# Patient Record
Sex: MALE | Race: WHITE | NOT HISPANIC OR LATINO | Employment: UNEMPLOYED | URBAN - METROPOLITAN AREA
[De-identification: names, ages, dates, MRNs, and addresses within clinical notes are randomized per-mention and may not be internally consistent; named-entity substitution may affect disease eponyms.]

---

## 2025-06-30 NOTE — PROGRESS NOTES
Percocet 10 mg X 4 and Lyrica 75 mg at bedtime from Steve Krishnamurthy MD     History of Present Complaint:  The patient was referred to us by Referring Provider: ***. this is 40 y.o.  male {Accompanied by:[id number]}with a past history of {kt past medical history:23446} presenting with {kt mrdica symptoms:89143}    Pain started {pain onset:96797}  Pain is {Better or worse:60993}   Patient history significant for the following red flags: {Red flags:36026}  The pain is described as {Pain description:58262} and is relieved by {pain relief:06552}      Prior Pain Therapies: {Prior pain therapy:72251}    Past surgical history:  {Past surgical history:72256}           Procedures:   *** the patient has had a ***% improvement in pain.    Portions of record reviewed for pertinent issues: active problem list, medication list, allergies, family history, social history, notes from last encounter, encounters, lab results, imaging and other available records.    I have personally reviewed the OARRS report for this patient. This report is scanned into the electronic medical record. I have considered the risks of abuse, dependence, addiction and diversion. It showed: Percocet 10 mg x 4 plus Lyrica 75 mg at bedtime from Steve Krishnamurthy MD [facility]  OPIOID RISK ASSESSMENT SCORE ***/26  Controlled substance agreement: ***  Activities of daily living: ***  Adverse effects: ***  Analgesia: W/O ***/10, W ***/10  {***}  Toxicology screen: ***  Aberrant behavior: ***  My patient has no underlying substance abuse or alcohol abuse and there's no mental health conditions contributing to the patient's pain.        Diagnostic studies:  10/4/2024 lumbar x-ray showed slight degenerative compression at L2 but no compression fractures    Employment/disability/litigation: {ktlitigation:85309}    Social History:  {KT social history:23752}       Review of Systems       Physical Exam       Assessment  ***           Plan  At least 50% of the visit was  involved in the discussion of the options for treatment. We discussed exercises, medication, interventional therapies and surgery. Healthy life style is essential with patient hard work to achieve the wellness. In addition; discussion with the patient and/or family about any of the diagnostic results, impressions and/or recommended diagnostic studies, prognosis, risks and benefits of treatment options, instructions for treatment and/or follow-up, importance of compliance with chosen treatment options, risk-factor reduction, and patient/family education.           Recommend self-directed physical therapy with at least daily exercises for minimum of 20-minute  *** Smoking cessation  Healthy lifestyle and anti-inflammatory diet in addition to weight control discussed with the patient  Alternative chronic pain therapies was discussed, encouraged and information was handed  Return to Clinic 3 months       *Please note this report has been produced using speech recognition software and may contain errors related to that system including grammar, punctuation and spelling as well as words and phrases that may be inappropriate. If there are questions or concerns, please feel free to contact me to clarify.    Paramjit Telles MD

## 2025-07-01 NOTE — PROGRESS NOTES
No chief complaint on file.    History of Present Complaint:  The patient was referred to us by Referring Provider: ***. this is 40 y.o.  male  with a past history of Chronic Pain: pt is on chronic opioid therapy. Pt states he has his refills and is doing well on the medication. Condition is stable.  Restless Leg Symptoms, Sleep Difficulty:  presenting with {kt mrdica symptoms:00008}    Pain started *** due to {pain onset:31321}  Pain is {Better or worse:48938} {DESC; BETTER/WORSE:91801}   The pain is described as {Pain description:37466} and is relieved by {pain relief:88991}      Prior Pain Therapies: {Prior pain therapy:99329}  Past surgical history:  {Past surgical history:31935}      Employment/disability/litigation: {ktlitigation:11944}  Social history: {KT social history:51736}    Diagnostic studies: External images        Yang Each Box that Applies Female Male   FAMILY HISTORY OF SUBSTANCE ABUSE  Yang the boxes that applies   Alcohol ?  1    ? 3   Illegal drugs ?  2 ? 3   Rx drugs ?  4 ? 4   PERSONAL HISTORY OF SUBSTNACE ABUSE   Alcohol ?  3 ?  3   Illegal drugs ?  4 ?  4    Rx drugs ?  5 ?  5   Age Between 16-45 years ?  1 ?  1   History of Preadolescent Sexual Abuse ?  3 ?  0   PSYCHOLOGIC DISEASE   ADD, OCD, bipolar, schizophrenia   ?  2 ?  2   Depression ?  1 ?  1   Scoring Totals       Scoring (Risk)  0-3 - Low  4-7 - Moderate  8 - High  Opioid Risk Assessment Score ***/26

## 2025-07-02 ENCOUNTER — APPOINTMENT (OUTPATIENT)
Dept: PAIN MEDICINE | Facility: CLINIC | Age: 40
End: 2025-07-02
Payer: COMMERCIAL

## 2025-07-24 NOTE — PROGRESS NOTES
History of Present Complaint:  The patient was referred to us by Referring Provider: Self-referral. this is 40 y.o.  male accompanied by his wife Estela with a past history of anxiety/depression, PTSD, TBI, migraine headache, restless leg syndrome, chronic lower back pain managed with Percocet 10 mg x 4 daily and pregabalin 75 mg twice daily from PCP for many years presenting with changing insurance and his primary care now wants a recommendation from pain management prior to continuation of his pain medication.  The patient stated that he was treated at the [facility] and he had his MRI goes back to 2018 which showed central disc herniation with some degenerative changes and minor foraminal stenosis.  The patient has tried multiple injection and a lot of physical therapy without any benefit.  The only thing works for him is Percocet 10 mg x 4 daily in addition to his pregabalin 75 mg x 2 daily.  The patient is able to do a full-time job as a   and his wife with him stating that he is able to do his home duties to her and his kids without any issues.  The patient denies any saddle paresthesia paralysis or incontinence  The patient denies any red flags        Procedures:   Multiple injections between 2017 and 2021 at [facility] without benefit    Portions of record reviewed for pertinent issues: active problem list, medication list, allergies, family history, social history, notes from last encounter, encounters, lab results, imaging and other available records.    I have personally reviewed the OARRS report for this patient. This report is scanned into the electronic medical record. I have considered the risks of abuse, dependence, addiction and diversion. It showed: Percocet 10 mg x 4 daily and pregabalin 75 mg x 2 daily from [facility] PCP  OPIOID RISK ASSESSMENT SCORE 3/26  Aberrant behavior: None  My patient has no underlying substance abuse or alcohol abuse and there's no mental health  conditions contributing to the patient's pain.    Patient is being treated with opioid therapy for pain from PCP and is responding appropriately.  There are NO signs of opioid intoxication, abuse, addiction or withdrawal.  Pupils are equal, reactive to light bilateral, appropriate speech and cognition. Patient denies any opioid induced constipation. The OARRS registry should followed by PCP periodically, urine toxicology.    Patient is advised and warned  in specific detail about potential benefits of opioids along with risks and side effects including, but not limited to, dependency, addiction, tolerance, hyperalgesia, anxiety, depression, insomnia, endocrine changes, immunologic disturbances, respiratory depression and death.     Caution advised regarding the use of medications prescribed at this office and specific mention made regarding not to drive or operate heavy machinery if feeling side effects from this the medications. Patient  expressed an understanding in regards to these particular concerns.     Discussed non-opioid options including (But no limited), physical wellness, antiinflammatory diet, icing and heating, meditation, relaxation, massage and acupuncture.    PCP will continue to monitor and adjust medications as needed.                Diagnostic studies:  12/4/2024 lumbar x-ray report from [facility] showed minimal degenerative changes possible L2 superior endplate mild fracture.  Thoracic spine x-ray did not show any abnormality      Employment/disability/litigation: Works as a   full-time    Social History:  with 3 children      Review of Systems   Constitutional:  Negative for chills, fever and unexpected weight change.   HENT:  Negative for congestion and trouble swallowing.    Respiratory:  Negative for cough, shortness of breath and wheezing.    Cardiovascular:  Negative for chest pain.   Gastrointestinal:  Negative for abdominal distention, abdominal pain, blood in  stool, constipation, diarrhea, nausea and vomiting.   Genitourinary:  Negative for difficulty urinating.   Musculoskeletal:  Positive for back pain and myalgias. Negative for arthralgias and joint swelling.   Skin:  Negative for color change.   Neurological:  Negative for dizziness, speech difficulty, light-headedness and headaches.   Psychiatric/Behavioral:  Negative for confusion and sleep disturbance.           Physical Exam  Constitutional:       General: He is awake.      Appearance: Normal appearance.     HENT:      Head: Normocephalic and atraumatic.      Nose: Nose normal.      Mouth/Throat:      Mouth: Mucous membranes are moist.     Eyes:      Pupils: Pupils are equal, round, and reactive to light.     Neck:      Thyroid: No thyroid mass.      Trachea: Phonation normal.     Cardiovascular:      Rate and Rhythm: Normal rate and regular rhythm.   Pulmonary:      Effort: Pulmonary effort is normal. No respiratory distress.      Breath sounds: Normal air entry. No decreased breath sounds, wheezing, rhonchi or rales.   Abdominal:      General: Bowel sounds are normal. There is no distension.      Palpations: Abdomen is soft.      Tenderness: There is no abdominal tenderness.     Musculoskeletal:         General: Tenderness present.      Cervical back: Neck supple.      Right lower leg: No edema.      Left lower leg: No edema.     Skin:     General: Skin is warm.      Capillary Refill: Capillary refill takes less than 2 seconds.     Neurological:      General: No focal deficit present.      Mental Status: He is alert and oriented to person, place, and time. Mental status is at baseline.      Cranial Nerves: Cranial nerves 2-12 are intact.      Motor: Motor function is intact.     Psychiatric:         Attention and Perception: Attention and perception normal.         Mood and Affect: Mood normal.         Speech: Speech normal.         Behavior: Behavior normal.            Assessment  This is a pleasant 40 years  old gentleman who is here accompanied with his wife who works  at [employer] complaining of chronic lower back pain who had physical therapy a year and a half ago and he had injections the last one 2 years ago for many years without much benefit he is able to function with Percocet 10 mg x 4 daily in addition to pregabalin 75 mg x 2 daily from his PCP.  I explained the patient that I do not prescribe chronic opioid therapy and our program is to get the patient off opioid therapy.  But I feel like he is stable on this medication and he does not want to change anything at this time.  I told him our program has multiple technologies and we have really a lot of improved back therapies and if he is interested to switch and get off his medication I will be happy to order new MRI on him and discuss any other modalities in the meantime he is very open to get enrolled in [facility] program which we did today.    Plan  At least 50% of the visit was involved in the discussion of the options for treatment. We discussed exercises, medication, interventional therapies and surgery. Healthy life style is essential with patient hard work to achieve the wellness. In addition; discussion with the patient and/or family about any of the diagnostic results, impressions and/or recommended diagnostic studies, prognosis, risks and benefits of treatment options, instructions for treatment and/or follow-up, importance of compliance with chosen treatment options, risk-factor reduction, and patient/family education.           Recommend self-directed physical therapy with at least daily exercises for minimum of 20-minute  Referral to [facility]  PCP and continue his opioid therapy  Healthy lifestyle and anti-inflammatory diet in addition to weight control discussed with the patient  Alternative chronic pain therapies was discussed, encouraged and information was handed  Return to Clinic 3 months       *Please note this report has  been produced using speech recognition software and may contain errors related to that system including grammar, punctuation and spelling as well as words and phrases that may be inappropriate. If there are questions or concerns, please feel free to contact me to clarify.    Paramjit Telles MD

## 2025-07-29 ENCOUNTER — APPOINTMENT (OUTPATIENT)
Dept: PAIN MEDICINE | Facility: CLINIC | Age: 40
End: 2025-07-29
Payer: COMMERCIAL

## 2025-07-29 VITALS
OXYGEN SATURATION: 98 % | SYSTOLIC BLOOD PRESSURE: 130 MMHG | WEIGHT: 170 LBS | HEIGHT: 68 IN | DIASTOLIC BLOOD PRESSURE: 79 MMHG | HEART RATE: 70 BPM | RESPIRATION RATE: 10 BRPM | TEMPERATURE: 96.1 F | BODY MASS INDEX: 25.76 KG/M2

## 2025-07-29 DIAGNOSIS — F11.20 OPIOID DEPENDENCE WITH CURRENT USE (MULTI): Primary | ICD-10-CM

## 2025-07-29 DIAGNOSIS — M51.360 DEGENERATION OF INTERVERTEBRAL DISC OF LUMBAR REGION WITH DISCOGENIC BACK PAIN: ICD-10-CM

## 2025-07-29 PROBLEM — R03.0 ELEVATED BLOOD PRESSURE READING: Status: ACTIVE | Noted: 2025-01-20

## 2025-07-29 PROBLEM — G47.9 DIFFICULTY SLEEPING: Status: ACTIVE | Noted: 2025-01-20

## 2025-07-29 PROBLEM — M51.16 LUMBAR DISC HERNIATION WITH RADICULOPATHY: Status: ACTIVE | Noted: 2018-10-26

## 2025-07-29 PROBLEM — G25.81 RESTLESS LEG: Status: ACTIVE | Noted: 2025-01-20

## 2025-07-29 PROBLEM — T14.90XA TRAUMA: Status: ACTIVE | Noted: 2022-10-08

## 2025-07-29 PROBLEM — M51.17 INTERVERTEBRAL DISC DISORDER WITH RADICULOPATHY OF LUMBOSACRAL REGION: Status: ACTIVE | Noted: 2022-05-10

## 2025-07-29 PROBLEM — I60.9 SAH (SUBARACHNOID HEMORRHAGE) (MULTI): Status: ACTIVE | Noted: 2022-10-08

## 2025-07-29 PROBLEM — M51.27 DISPLACEMENT OF LUMBOSACRAL INTERVERTEBRAL DISC: Status: ACTIVE | Noted: 2022-05-10

## 2025-07-29 PROCEDURE — 3008F BODY MASS INDEX DOCD: CPT | Performed by: ANESTHESIOLOGY

## 2025-07-29 PROCEDURE — 99214 OFFICE O/P EST MOD 30 MIN: CPT | Performed by: ANESTHESIOLOGY

## 2025-07-29 PROCEDURE — 99204 OFFICE O/P NEW MOD 45 MIN: CPT | Performed by: ANESTHESIOLOGY

## 2025-07-29 RX ORDER — OXYCODONE AND ACETAMINOPHEN 10; 325 MG/1; MG/1
1 TABLET ORAL 4 TIMES DAILY
COMMUNITY
Start: 2025-07-28 | End: 2025-08-27

## 2025-07-29 RX ORDER — UBROGEPANT 50 MG/1
50 TABLET ORAL
COMMUNITY
Start: 2025-07-08

## 2025-07-29 RX ORDER — OMEPRAZOLE 20 MG/1
20 CAPSULE, DELAYED RELEASE ORAL DAILY
COMMUNITY

## 2025-07-29 RX ORDER — PREGABALIN 75 MG/1
75 CAPSULE ORAL 2 TIMES DAILY
COMMUNITY
Start: 2025-02-22

## 2025-07-29 RX ORDER — ROPINIROLE 0.5 MG/1
0.5 TABLET, FILM COATED ORAL NIGHTLY
COMMUNITY
Start: 2025-07-08

## 2025-07-29 ASSESSMENT — ENCOUNTER SYMPTOMS
LOSS OF SENSATION IN FEET: 1
DEPRESSION: 0
SHORTNESS OF BREATH: 0
WHEEZING: 0
ABDOMINAL DISTENTION: 0
COLOR CHANGE: 0
TROUBLE SWALLOWING: 0
OCCASIONAL FEELINGS OF UNSTEADINESS: 0
HEADACHES: 0
BACK PAIN: 1
FEVER: 0
SLEEP DISTURBANCE: 0
LIGHT-HEADEDNESS: 0
DIARRHEA: 0
CONFUSION: 0
JOINT SWELLING: 0
MYALGIAS: 1
CONSTIPATION: 0
VOMITING: 0
NAUSEA: 0
COUGH: 0
BLOOD IN STOOL: 0
ABDOMINAL PAIN: 0
DIZZINESS: 0
SPEECH DIFFICULTY: 0
ARTHRALGIAS: 0
UNEXPECTED WEIGHT CHANGE: 0
DIFFICULTY URINATING: 0
CHILLS: 0

## 2025-07-29 ASSESSMENT — PAIN SCALES - GENERAL
PAINLEVEL_OUTOF10: 5 - MODERATE PAIN
PAINLEVEL_OUTOF10: 5

## 2025-07-29 ASSESSMENT — LIFESTYLE VARIABLES
HOW OFTEN DO YOU HAVE A DRINK CONTAINING ALCOHOL: 2-4 TIMES A MONTH
HOW OFTEN DO YOU HAVE SIX OR MORE DRINKS ON ONE OCCASION: NEVER
HOW MANY STANDARD DRINKS CONTAINING ALCOHOL DO YOU HAVE ON A TYPICAL DAY: 3 OR 4
SKIP TO QUESTIONS 9-10: 0
AUDIT-C TOTAL SCORE: 3

## 2025-07-29 ASSESSMENT — PAIN DESCRIPTION - DESCRIPTORS: DESCRIPTORS: THROBBING;SHARP;SHOOTING

## 2025-07-29 ASSESSMENT — PATIENT HEALTH QUESTIONNAIRE - PHQ9
SUM OF ALL RESPONSES TO PHQ9 QUESTIONS 1 & 2: 0
2. FEELING DOWN, DEPRESSED OR HOPELESS: NOT AT ALL
1. LITTLE INTEREST OR PLEASURE IN DOING THINGS: NOT AT ALL

## 2025-07-29 ASSESSMENT — ANXIETY QUESTIONNAIRES
1. FEELING NERVOUS, ANXIOUS, OR ON EDGE: SEVERAL DAYS
5. BEING SO RESTLESS THAT IT IS HARD TO SIT STILL: NOT AT ALL
6. BECOMING EASILY ANNOYED OR IRRITABLE: NEARLY EVERY DAY
3. WORRYING TOO MUCH ABOUT DIFFERENT THINGS: SEVERAL DAYS
2. NOT BEING ABLE TO STOP OR CONTROL WORRYING: SEVERAL DAYS
7. FEELING AFRAID AS IF SOMETHING AWFUL MIGHT HAPPEN: SEVERAL DAYS
4. TROUBLE RELAXING: MORE THAN HALF THE DAYS
IF YOU CHECKED OFF ANY PROBLEMS ON THIS QUESTIONNAIRE, HOW DIFFICULT HAVE THESE PROBLEMS MADE IT FOR YOU TO DO YOUR WORK, TAKE CARE OF THINGS AT HOME, OR GET ALONG WITH OTHER PEOPLE: SOMEWHAT DIFFICULT
GAD7 TOTAL SCORE: 9

## 2025-07-29 ASSESSMENT — PAIN - FUNCTIONAL ASSESSMENT: PAIN_FUNCTIONAL_ASSESSMENT: 0-10

## 2025-07-29 ASSESSMENT — COLUMBIA-SUICIDE SEVERITY RATING SCALE - C-SSRS
2. HAVE YOU ACTUALLY HAD ANY THOUGHTS OF KILLING YOURSELF?: NO
6. HAVE YOU EVER DONE ANYTHING, STARTED TO DO ANYTHING, OR PREPARED TO DO ANYTHING TO END YOUR LIFE?: NO
1. IN THE PAST MONTH, HAVE YOU WISHED YOU WERE DEAD OR WISHED YOU COULD GO TO SLEEP AND NOT WAKE UP?: NO